# Patient Record
(demographics unavailable — no encounter records)

---

## 2024-10-24 NOTE — HEALTH RISK ASSESSMENT
[Good] : ~his/her~  mood as  good [FreeTextEntry1] : see above  [No] : In the past 12 months have you used drugs other than those required for medical reasons? No [No falls in past year] : Patient reported no falls in the past year [Little interest or pleasure doing things] : 1) Little interest or pleasure doing things [Feeling down, depressed, or hopeless] : 2) Feeling down, depressed, or hopeless [0] : 2) Feeling down, depressed, or hopeless: Not at all (0) [PHQ-2 Negative - No further assessment needed] : PHQ-2 Negative - No further assessment needed [de-identified] : yoga  [de-identified] : reg [[id number]] : 0 [Never] : Never [NO] : No [Patient reported PAP Smear was normal] : Patient reported PAP Smear was normal [HIV test declined] : HIV test declined [Hepatitis C test offered] : Hepatitis C test offered [Change in mental status noted] : No change in mental status noted [Language] : denies difficulty with language [Behavior] : denies difficulty with behavior [Learning/Retaining New Information] : denies difficulty learning/retaining new information [Handling Complex Tasks] : denies difficulty handling complex tasks [Reasoning] : denies difficulty with reasoning [Spatial Ability and Orientation] : denies difficulty with spatial ability and orientation [None] : None [With Family] : lives with family [# of Members in Household ___] :  household currently consist of [unfilled] member(s) [Unemployed] : unemployed [College] : College [] :  [# Of Children ___] : has [unfilled] children [Sexually Active] : sexually active [Feels Safe at Home] : Feels safe at home [Fully functional (bathing, dressing, toileting, transferring, walking, feeding)] : Fully functional (bathing, dressing, toileting, transferring, walking, feeding) [Fully functional (using the telephone, shopping, preparing meals, housekeeping, doing laundry, using] : Fully functional and needs no help or supervision to perform IADLs (using the telephone, shopping, preparing meals, housekeeping, doing laundry, using transportation, managing medications and managing finances) [Reports changes in hearing] : Reports no changes in hearing [Reports changes in vision] : Reports no changes in vision [Reports changes in dental health] : Reports no changes in dental health [Smoke Detector] : smoke detector [Carbon Monoxide Detector] : carbon monoxide detector [Safety elements used in home] : safety elements used in home [Seat Belt] :  uses seat belt [Sunscreen] : uses sunscreen [Travel to Developing Areas] : travel to developing areas [TB Exposure] : is being exposed to tuberculosis [Caregiver Concerns] : has caregiver concerns [PapSmearDate] : 2024 [de-identified] : last eye exam few yrs  [de-identified] : 1 week ago Oct 15 [AdvancecareDate] : 10/24/24

## 2024-10-24 NOTE — COUNSELING
[Sleep ___ hours/day] : Sleep [unfilled] hours/day [Engage in a relaxing activity] : Engage in a relaxing activity [Plan in advance] : Plan in advance [Structured Weight Management Program suggested:] : Structured weight management program suggested [FreeTextEntry1] : healthy   [FreeTextEntry2] : bmi 23 weight 144  [None] : None

## 2024-10-24 NOTE — ASSESSMENT
[FreeTextEntry1] : health She needs eye exam and mammogram . she is up to date with  dental and gyn exams    2 bmi 23  healthy eating exercise  3 gerd  Gastroesophageal reflux disease (GERD) occurs when stomach acid frequently flows back into the tube connecting your mouth and stomach (esophagus). This backwash (acid reflux) can irritate the lining of your esophagus.  Many people experience acid reflux from time to time. GERD is mild acid reflux that occurs at least twice a week, or moderate to severe acid reflux that occurs at least once a week.   Most people can manage the discomfort of GERD with lifestyle changes and over-the-counter medications. But some people with GERD may need stronger medications or surgery to ease symptoms.  Symptoms  Common signs and symptoms of GERD include: A burning sensation in your chest (heartburn), usually after eating, which might be worse at night Chest pain Difficulty swallowing Regurgitation of food or sour liquid Sensation of a lump in your throat  If you have nighttime acid reflux, you might also experience: Chronic cough Laryngitis New or worsening asthma Disrupted sleep  When to see a doctor  Seek immediate medical care if you have chest pain, especially if you also have shortness of breath, or jaw or arm pain. These may be signs and symptoms of a heart attack.  Make an appointment with your doctor if you: Experience severe or frequent GERD symptoms Take over-the-counter medications for heartburn more than twice a week  Causes  GERD is caused by frequent acid reflux.  When you swallow, a circular band of muscle around the bottom of your esophagus (lower esophageal sphincter) relaxes to allow food and liquid to flow into your stomach. Then the sphincter closes again.  If the sphincter relaxes abnormally or weakens, stomach acid can flow back up into your esophagus. This constant backwash of acid irritates the lining of your esophagus, often causing it to become inflamed.  Risk factors  Conditions that can increase your risk of GERD include: Obesity Bulging of the top of the stomach up into the diaphragm (hiatal hernia) Pregnancy Connective tissue disorders, such as scleroderma Delayed stomach emptying  Factors that can aggravate acid reflux include: Smoking Eating large meals or eating late at night Eating certain foods (triggers) such as fatty or fried foods Drinking certain beverages, such as alcohol or coffee Taking certain medications, such as aspirin  Complications  Over time, chronic inflammation in your esophagus can cause: Narrowing of the esophagus (esophageal stricture). Damage to the lower esophagus from stomach acid causes scar tissue to form. The scar tissue narrows the food pathway, leading to problems with swallowing. An open sore in the esophagus (esophageal ulcer). Stomach acid can wear away tissue in the esophagus, causing an open sore to form. An esophageal ulcer can bleed, cause pain and make swallowing difficult. Precancerous changes to the esophagus (Boucher's esophagus). Damage from acid can cause changes in the tissue lining the lower esophagus. These changes are associated with an increased risk of esophageal cancer pt is doing well on famotidine  she needs to hold it for three weeks before  hpylori.  4. loose stools   test stools for gi pcr and stronglyloiides and parastites she was in her country [facility].  in Feb.   also hpylori can cause this as well.   5. vit d def  test for def .

## 2024-10-24 NOTE — PAST MEDICAL HISTORY
[Menstruating] : menstruating [Definite ___ (Date)] : the last menstrual period was [unfilled] [Regular Cycle Intervals] : have been regular [Total Preg ___] : G[unfilled] [Live Births ___] : P[unfilled]  [Full Term ___] : Full Term: [unfilled] [Living ___] : Living: [unfilled] [AB Induced ___] : elective abortions: [unfilled]  [AB Spont ___] : miscarriages: [unfilled]

## 2024-10-24 NOTE — HISTORY OF PRESENT ILLNESS
[FreeTextEntry1] : cpe  [de-identified] : Pt is a 41  yr old woman with h hx of anemia , back pain and sp hpylori who is here for her cpe. She -denies any headaches, nausea, vomiting, fever, chills, sweats, chest pain, chest pressure, diarrhea, constipation, dysphagia, sour taste in the mouth, dizziness, leg swelling, leg pain, myalgias, arthralgias, itchy eyes, itchy ears, heartburn, or reflux.  Pt states she had a hoarseness of her voice and was straining  to use her voice and went to ent  and told it was due to gas and given medications. famotidine. which helped with hoarseness of her voice . She wakes up she feels nauseated when brushing her teeth . She doesn't have cough , sore throat , or feeling of epigastric burning.  She hasn't lost weight and doesn't have  dysphagia.  She doesn't  have black stools    She traveled to her country in Feb  .   She states she also has  loose stools  three times a day  andother days once a day  but can have 5 bm in aday.  No blood in her stools or mucus . she was on amoxicillin 5months ago for a cold.

## 2024-10-24 NOTE — PHYSICAL EXAM
[Conjunctiva] : the conjunctiva were normal in both eyes [PERRL] : pupils were equal in size, round, and reactive to light [EOM Intact] : extraocular movements were intact [Normal Appearance] : was normal in appearance [Neck Supple] : was supple [Enlarged Diffusely] : was not enlarged [Rate ___] : at [unfilled] breaths per minute [Normal Rhythm/Effort] : normal respiratory rhythm and effort [Clear Bilaterally] : the lungs were clear to auscultation bilaterally [Normal to Percussion] : the lungs were normal to percussion [Heart Rate ___] : [unfilled] bpm [Rhythm Regular] : regular [Rt] : no varicose veins of the right leg [Lt] : no varicose veins of the left leg [Bruit] : no bruit heard [Normal Rate] : normal [Normal S1] : normal S1 [Normal S2] : normal S2 [S3] : no S3 [S4] : no S4 [No Murmur] : no murmurs heard [No Pitting Edema] : no pitting edema present [Right Carotid Bruit] : no bruit heard over the right carotid [Left Carotid Bruit] : no bruit heard over the left carotid [Right Femoral Bruit] : no bruit heard over the right femoral artery [Left Femoral Bruit] : no bruit heard over the left femoral artery [2+] : left 2+ [No Abnormalities] : the abdominal aorta was not enlarged and no bruit was heard [Examination Of The Breasts] : a normal appearance [No Discharge] : no discharge [Soft, Nontender] : the abdomen was soft and nontender [No Mass] : no masses were palpated [No HSM] : no hepatosplenomegaly noted [Postauricular Lymph Nodes Enlarged Bilaterally] : nodes not enlarged [Preauricular Lymph Nodes Enlarged Bilaterally] : nodes not enlarged [Submandibular Lymph Nodes Enlarged Bilaterally] : nodes not enlarged [Suboccipital Lymph Nodes Enlarged Bilaterally] : nodes not enlarged [Submental Lymph Nodes Enlarged] : nodes not enlarged [Cervical Lymph Nodes Enlarged Posterior Bilaterally] : nodes not enlarged [Cervical Lymph Nodes Enlarged Anterior Bilaterally] : nodes not enlarged [Supraclavicular Lymph Nodes Enlarged Bilaterally] : nodes not enlarged [Axillary Lymph Nodes Enlarged Bilaterally] : nodes not enlarged [Epitrochlear Lymph Nodes Enlarged Bilaterally] : nodes not enlarged [Femoral Lymph Nodes Enlarged Bilaterally] : nodes not enlarged [Inguinal Lymph Nodes Enlarged Bilaterally] : nodes not enlarged [No Lymphangitis] : no lymphangitis observed [Normal Kyphosis] : normal kyphosis [No Visual Abnormalities] : no visible abnormalities [Normal Lordosis] : normal lordosis [No Scoliosis] : no scoliosis [No Tenderness to Palpation] : no spine tenderness on palpation [No Masses] : no masses [Full ROM] : full ROM [No Pain with ROM] : no pain with motion in any direction [Intact] : all reflexes within normal limits bilaterally [Normal Station and Gait] : the gait and station were normal [Normal Motor Tone] : the muscle tone was normal [Involuntary Movements] : no involuntary movements were seen [Normal Scalp] : inspection of the scalp showed no abnormalities [Examination Of The Hair] : texture and distribution of hair was normal [Abnormal Color] : normal color and pigmentation [Complexion Medium] : medium complexion [Skin Lesions 1] : no skin lesions were observed [Tattoo - Single] : no tattoos observed [Skin Turgor Decreased] : normal skin turgor [Normal] : the deep tendon reflexes were normal [Normal Mental Status] : the patient's orientation, memory, attention, language and fund of knowledge were normal [Appropriate] : appropriate [Impaired judgment] : intact judgment [Impaired Insight] : intact insight [de-identified] : tongue normal teeth in good repair

## 2025-06-11 NOTE — PHYSICAL EXAM
[No Acute Distress] : no acute distress [Well Nourished] : well nourished [Well-Appearing] : well-appearing [Well Developed] : well developed [Normal Sclera/Conjunctiva] : normal sclera/conjunctiva [EOMI] : extraocular movements intact [PERRL] : pupils equal round and reactive to light [Normal Outer Ear/Nose] : the outer ears and nose were normal in appearance [Normal Oropharynx] : the oropharynx was normal [No JVD] : no jugular venous distention [No Lymphadenopathy] : no lymphadenopathy [Thyroid Normal, No Nodules] : the thyroid was normal and there were no nodules present [Supple] : supple [No Accessory Muscle Use] : no accessory muscle use [No Respiratory Distress] : no respiratory distress  [Clear to Auscultation] : lungs were clear to auscultation bilaterally [Normal Rate] : normal rate  [Regular Rhythm] : with a regular rhythm [Normal S1, S2] : normal S1 and S2 [No Murmur] : no murmur heard [No Abdominal Bruit] : a ~M bruit was not heard ~T in the abdomen [No Carotid Bruits] : no carotid bruits [No Varicosities] : no varicosities [Pedal Pulses Present] : the pedal pulses are present [No Edema] : there was no peripheral edema [No Palpable Aorta] : no palpable aorta [No Extremity Clubbing/Cyanosis] : no extremity clubbing/cyanosis [Soft] : abdomen soft [Non Tender] : non-tender [Non-distended] : non-distended [No HSM] : no HSM [No Masses] : no abdominal mass palpated [Normal Bowel Sounds] : normal bowel sounds [Normal Anterior Cervical Nodes] : no anterior cervical lymphadenopathy [Normal Posterior Cervical Nodes] : no posterior cervical lymphadenopathy [No Spinal Tenderness] : no spinal tenderness [No CVA Tenderness] : no CVA  tenderness [No Joint Swelling] : no joint swelling [No Rash] : no rash [Grossly Normal Strength/Tone] : grossly normal strength/tone [Coordination Grossly Intact] : coordination grossly intact [No Focal Deficits] : no focal deficits [Normal Gait] : normal gait [Deep Tendon Reflexes (DTR)] : deep tendon reflexes were 2+ and symmetric [Normal Insight/Judgement] : insight and judgment were intact [Normal Affect] : the affect was normal

## 2025-06-11 NOTE — HISTORY OF PRESENT ILLNESS
[FreeTextEntry1] : fu  [de-identified] : Pt is a 41 yr old woman with h hx of anemia , back pain and sp hpylori who is here for her cpe. She -denies any headaches, nausea, vomiting, fever, chills, sweats, chest pain, chest pressure, diarrhea, constipation, dysphagia, sour taste in the mouth, dizziness, leg swelling, leg pain, myalgias, arthralgias, itchy eyes, itchy ears, heartburn, or reflux.  Pt has seen ent  for her hoarseness of her throat and taken pepcid.   She states recently she has tingling in left arm from shoulder  to her fingers and occs intermittently  and occur with feeling her heart skipped a beat and occurred a few times  but for last month it  hasnt recurred.  She drinks coffee twice a day. She doesnt have sob, but has occ dizziness.  She has not lost consciousness.

## 2025-06-11 NOTE — HEALTH RISK ASSESSMENT
[No] : In the past 12 months have you used drugs other than those required for medical reasons? No [No falls in past year] : Patient reported no falls in the past year [Little interest or pleasure doing things] : 1) Little interest or pleasure doing things [Feeling down, depressed, or hopeless] : 2) Feeling down, depressed, or hopeless [0] : 2) Feeling down, depressed, or hopeless: Not at all (0) [PHQ-2 Negative - No further assessment needed] : PHQ-2 Negative - No further assessment needed [Never] : Never [de-identified] : yoga  [[id number]] : 0 [de-identified] : reg [AdvancecareDate] : 6/11/25

## 2025-06-11 NOTE — ASSESSMENT
[FreeTextEntry1] : 1 chest pain egk  lab testing referral to cardiologist  and if pt develops pain go to er.  ekg  rate 77/min rsrv1 pr 158 qt 394/377 qrs 78 2. tingling of arm rule out  cardiac crelated  if not  further evaluation will be done  3 gerd  Gastroesophageal reflux disease (GERD) occurs when stomach acid frequently flows back into the tube connecting your mouth and stomach (esophagus). This backwash (acid reflux) can irritate the lining of your esophagus.  Many people experience acid reflux from time to time. GERD is mild acid reflux that occurs at least twice a week, or moderate to severe acid reflux that occurs at least once a week.   Most people can manage the discomfort of GERD with lifestyle changes and over-the-counter medications. But some people with GERD may need stronger medications or surgery to ease symptoms.  Symptoms  Common signs and symptoms of GERD include: A burning sensation in your chest (heartburn), usually after eating, which might be worse at night Chest pain Difficulty swallowing Regurgitation of food or sour liquid Sensation of a lump in your throat  If you have nighttime acid reflux, you might also experience: Chronic cough Laryngitis New or worsening asthma Disrupted sleep  When to see a doctor  Seek immediate medical care if you have chest pain, especially if you also have shortness of breath, or jaw or arm pain. These may be signs and symptoms of a heart attack.  Make an appointment with your doctor if you: Experience severe or frequent GERD symptoms Take over-the-counter medications for heartburn more than twice a week  Causes  GERD is caused by frequent acid reflux.  When you swallow, a circular band of muscle around the bottom of your esophagus (lower esophageal sphincter) relaxes to allow food and liquid to flow into your stomach. Then the sphincter closes again.  If the sphincter relaxes abnormally or weakens, stomach acid can flow back up into your esophagus. This constant backwash of acid irritates the lining of your esophagus, often causing it to become inflamed.  Risk factors  Conditions that can increase your risk of GERD include: Obesity Bulging of the top of the stomach up into the diaphragm (hiatal hernia) Pregnancy Connective tissue disorders, such as scleroderma Delayed stomach emptying  Factors that can aggravate acid reflux include: Smoking Eating large meals or eating late at night Eating certain foods (triggers) such as fatty or fried foods Drinking certain beverages, such as alcohol or coffee Taking certain medications, such as aspirin  Complications  Over time, chronic inflammation in your esophagus can cause: Narrowing of the esophagus (esophageal stricture). Damage to the lower esophagus from stomach acid causes scar tissue to form. The scar tissue narrows the food pathway, leading to problems with swallowing. An open sore in the esophagus (esophageal ulcer). Stomach acid can wear away tissue in the esophagus, causing an open sore to form. An esophageal ulcer can bleed, cause pain and make swallowing difficult. Precancerous changes to the esophagus (Boucher's esophagus). Damage from acid can cause changes in the tissue lining the lower esophagus. These changes are associated with an increased risk of esophageal cancer

## 2025-06-11 NOTE — COUNSELING
[Sleep ___ hours/day] : Sleep [unfilled] hours/day [Engage in a relaxing activity] : Engage in a relaxing activity [Plan in advance] : Plan in advance [FreeTextEntry2] : bmi 23  weight 144